# Patient Record
Sex: FEMALE | NOT HISPANIC OR LATINO | URBAN - METROPOLITAN AREA
[De-identification: names, ages, dates, MRNs, and addresses within clinical notes are randomized per-mention and may not be internally consistent; named-entity substitution may affect disease eponyms.]

---

## 2020-11-18 ENCOUNTER — OFFICE VISIT (OUTPATIENT)
Dept: URBAN - METROPOLITAN AREA CLINIC 11 | Facility: CLINIC | Age: 70
End: 2020-11-18
Payer: MEDICARE

## 2020-11-18 DIAGNOSIS — H04.123 DRY EYE SYNDROME OF BILATERAL LACRIMAL GLANDS: ICD-10-CM

## 2020-11-18 PROCEDURE — 92004 COMPRE OPH EXAM NEW PT 1/>: CPT | Performed by: OPHTHALMOLOGY

## 2020-11-18 PROCEDURE — 76514 ECHO EXAM OF EYE THICKNESS: CPT | Performed by: OPHTHALMOLOGY

## 2020-11-18 PROCEDURE — 92020 GONIOSCOPY: CPT | Performed by: OPHTHALMOLOGY

## 2020-11-18 PROCEDURE — 92133 CPTRZD OPH DX IMG PST SGM ON: CPT | Performed by: OPHTHALMOLOGY

## 2020-11-18 ASSESSMENT — INTRAOCULAR PRESSURE
OS: 14
OD: 13

## 2020-11-18 ASSESSMENT — KERATOMETRY
OS: 43.13
OD: 43.25

## 2020-11-18 NOTE — IMPRESSION/PLAN
Impression: Primary open-angle glaucoma, bilateral, moderate stage: V15.1973. Goal IOP 13 and below due to CCT and VF progression with IOP 13-16 /522, 11/20 OCT 80/71 7/6, 1808 [facility] 72/71 Plan: Discussed diagnosis with patient. RNFL OCT ordered and reviewed with patient. Discussed treatment options. Recommend patient continue Timolol BID OU and Latanoprost QHS OU. Recommend pt undergo SLT OU to help lower IOP further. R/B discussed, pt understands and wishes to proceed.

## 2020-11-18 NOTE — ASSESSMENT/PLAN
Impression: OCT - OD: Good-inferior thinning; OS: Good-inferior thinning Plan: Inferior thinning OU 
[facility] 72/71

## 2020-12-02 ENCOUNTER — PROCEDURE (OUTPATIENT)
Dept: URBAN - METROPOLITAN AREA CLINIC 11 | Facility: CLINIC | Age: 70
End: 2020-12-02
Payer: MEDICARE

## 2020-12-02 PROCEDURE — 65855 TRABECULOPLASTY LASER SURG: CPT | Performed by: OPHTHALMOLOGY

## 2021-02-02 ENCOUNTER — OFFICE VISIT (OUTPATIENT)
Dept: URBAN - METROPOLITAN AREA CLINIC 11 | Facility: CLINIC | Age: 71
End: 2021-02-02
Payer: MEDICARE

## 2021-02-02 DIAGNOSIS — H40.1132 PRIMARY OPEN-ANGLE GLAUCOMA, BILATERAL, MODERATE STAGE: Primary | ICD-10-CM

## 2021-02-02 PROCEDURE — 99213 OFFICE O/P EST LOW 20 MIN: CPT | Performed by: OPHTHALMOLOGY

## 2021-02-02 ASSESSMENT — INTRAOCULAR PRESSURE
OD: 12
OS: 13

## 2021-02-02 NOTE — IMPRESSION/PLAN
Impression: Primary open-angle glaucoma, bilateral, moderate stage: F45.2943. Goal IOP 13 and below due to CCT and VF progression with IOP 13-16 /522, 11/20 OCT 80/71 7/6, [facility] 72/71
s/p SLT OU 12/2/2020 Plan: Discussed diagnosis with patient. IOP improved at goal. Discussed treatment options. Recommend patient continue Timolol BID OU and Latanoprost QHS OU. Ok to follow up Dr in [place]. If IOP goes above 15, discussed treatment options, may consider adding medication, or repeat SLT.